# Patient Record
Sex: FEMALE | Race: WHITE | NOT HISPANIC OR LATINO | Employment: OTHER | URBAN - METROPOLITAN AREA
[De-identification: names, ages, dates, MRNs, and addresses within clinical notes are randomized per-mention and may not be internally consistent; named-entity substitution may affect disease eponyms.]

---

## 2023-10-11 ENCOUNTER — CLINICAL SUPPORT (OUTPATIENT)
Dept: PRIMARY CARE | Facility: CLINIC | Age: 73
End: 2023-10-11
Payer: MEDICARE

## 2023-10-11 DIAGNOSIS — Z23 ENCOUNTER FOR IMMUNIZATION: ICD-10-CM

## 2023-10-11 PROCEDURE — G0008 ADMIN INFLUENZA VIRUS VAC: HCPCS | Performed by: INTERNAL MEDICINE

## 2023-10-11 PROCEDURE — 90662 IIV NO PRSV INCREASED AG IM: CPT | Performed by: INTERNAL MEDICINE

## 2023-10-11 NOTE — PROGRESS NOTES
Patient presents in the office for Flu vaccine.     This drug was administered by Colette Metz MA at 9:42 AM per physician order.    Patient tolerated well.

## 2023-11-10 PROBLEM — N81.4 UTERINE PROLAPSE: Status: ACTIVE | Noted: 2023-11-10

## 2023-11-10 PROBLEM — R91.8 LUNG NODULES: Status: ACTIVE | Noted: 2022-12-03

## 2023-11-10 PROBLEM — N18.31 STAGE 3A CHRONIC KIDNEY DISEASE (MULTI): Status: ACTIVE | Noted: 2022-12-14

## 2023-11-10 PROBLEM — N95.2 ATROPHY OF VAGINA: Status: ACTIVE | Noted: 2023-11-10

## 2023-11-10 PROBLEM — E83.52 HYPERCALCEMIA: Status: ACTIVE | Noted: 2022-12-04

## 2023-11-10 PROBLEM — R00.2 PALPITATIONS: Status: ACTIVE | Noted: 2023-11-10

## 2023-11-10 PROBLEM — J21.9 BRONCHIOLITIS: Status: ACTIVE | Noted: 2022-12-03

## 2023-11-10 PROBLEM — E03.9 HYPOTHYROIDISM: Status: ACTIVE | Noted: 2023-11-10

## 2023-11-10 PROBLEM — R35.0 URINARY FREQUENCY: Status: ACTIVE | Noted: 2023-11-10

## 2023-11-10 PROBLEM — B37.31 VAGINAL CANDIDIASIS: Status: ACTIVE | Noted: 2023-11-10

## 2023-11-10 PROBLEM — E83.42 HYPOMAGNESEMIA: Status: ACTIVE | Noted: 2022-12-04

## 2023-11-10 PROBLEM — J30.9 ALLERGIC RHINITIS: Status: ACTIVE | Noted: 2023-11-10

## 2023-11-10 PROBLEM — E55.9 VITAMIN D DEFICIENCY: Status: ACTIVE | Noted: 2023-11-10

## 2023-11-10 PROBLEM — R92.8 ABNORMAL MAMMOGRAM: Status: ACTIVE | Noted: 2023-11-10

## 2023-11-10 PROBLEM — D53.9 MACROCYTIC ANEMIA: Status: ACTIVE | Noted: 2022-12-04

## 2023-11-10 PROBLEM — E28.39 HYPOESTROGENISM: Status: ACTIVE | Noted: 2023-11-10

## 2023-11-10 PROBLEM — M81.0 OSTEOPOROSIS: Status: ACTIVE | Noted: 2023-11-10

## 2023-11-10 PROBLEM — I10 BENIGN ESSENTIAL HYPERTENSION: Status: ACTIVE | Noted: 2023-11-10

## 2023-11-10 PROBLEM — I10 HYPERTENSION: Status: ACTIVE | Noted: 2023-11-10

## 2023-11-10 PROBLEM — N81.10 FEMALE BLADDER PROLAPSE: Status: ACTIVE | Noted: 2023-11-10

## 2023-11-10 PROBLEM — M35.00 SJOGREN'S SYNDROME (MULTI): Status: ACTIVE | Noted: 2022-12-14

## 2023-11-10 PROBLEM — R06.02 SHORTNESS OF BREATH: Status: ACTIVE | Noted: 2022-12-03

## 2023-11-10 PROBLEM — F41.9 ANXIETY: Status: ACTIVE | Noted: 2023-11-10

## 2023-11-10 PROBLEM — N39.0 RECURRENT URINARY TRACT INFECTION: Status: ACTIVE | Noted: 2023-11-10

## 2023-11-10 PROBLEM — N28.9 RENAL INSUFFICIENCY: Status: ACTIVE | Noted: 2023-11-10

## 2023-11-10 RX ORDER — NITROFURANTOIN (MACROCRYSTALS) 100 MG/1
1 CAPSULE ORAL EVERY 12 HOURS
COMMUNITY
Start: 2022-03-08 | End: 2023-11-13 | Stop reason: ALTCHOICE

## 2023-11-10 RX ORDER — ALBUTEROL SULFATE 90 UG/1
2 AEROSOL, METERED RESPIRATORY (INHALATION) EVERY 4 HOURS PRN
COMMUNITY
Start: 2022-12-08 | End: 2023-11-13 | Stop reason: ALTCHOICE

## 2023-11-10 RX ORDER — LEVOTHYROXINE SODIUM 50 UG/1
1 TABLET ORAL DAILY
COMMUNITY
Start: 2015-04-10 | End: 2023-11-13 | Stop reason: ALTCHOICE

## 2023-11-10 RX ORDER — IRON PS COMPLEX/B12/FOLIC ACID 150-25-1
1 CAPSULE ORAL DAILY
COMMUNITY
Start: 2022-12-14 | End: 2023-11-13 | Stop reason: ALTCHOICE

## 2023-11-10 RX ORDER — FLUTICASONE PROPIONATE 50 MCG
2 SPRAY, SUSPENSION (ML) NASAL DAILY
COMMUNITY
Start: 2020-01-13 | End: 2023-11-13 | Stop reason: ALTCHOICE

## 2023-11-10 RX ORDER — ALBUTEROL SULFATE 90 UG/1
1-2 AEROSOL, METERED RESPIRATORY (INHALATION)
COMMUNITY
Start: 2022-12-02 | End: 2023-11-13 | Stop reason: ALTCHOICE

## 2023-11-13 ENCOUNTER — OFFICE VISIT (OUTPATIENT)
Dept: OBSTETRICS AND GYNECOLOGY | Facility: CLINIC | Age: 73
End: 2023-11-13
Payer: MEDICARE

## 2023-11-13 DIAGNOSIS — Z46.89 PESSARY MAINTENANCE: ICD-10-CM

## 2023-11-13 DIAGNOSIS — N95.2 VAGINAL ATROPHY: ICD-10-CM

## 2023-11-13 DIAGNOSIS — N81.4 UTERINE PROLAPSE: Primary | ICD-10-CM

## 2023-11-13 PROCEDURE — 1126F AMNT PAIN NOTED NONE PRSNT: CPT | Performed by: NURSE PRACTITIONER

## 2023-11-13 PROCEDURE — 99213 OFFICE O/P EST LOW 20 MIN: CPT | Performed by: NURSE PRACTITIONER

## 2023-11-13 PROCEDURE — 1159F MED LIST DOCD IN RCRD: CPT | Performed by: NURSE PRACTITIONER

## 2023-11-13 PROCEDURE — 1160F RVW MEDS BY RX/DR IN RCRD: CPT | Performed by: NURSE PRACTITIONER

## 2023-11-13 RX ORDER — BUTALB/ACETAMINOPHEN/CAFFEINE 50-325-40
1 TABLET ORAL DAILY
COMMUNITY

## 2023-11-13 RX ORDER — BISMUTH SUBSALICYLATE 262 MG
1 TABLET,CHEWABLE ORAL DAILY
COMMUNITY

## 2023-11-13 NOTE — PROGRESS NOTES
Pessary Check  This is a 73 y.o. with a #2 ring with support pessary in place to manage uterine prolapse here for a routine pessary check.    Date of last check: 8/21/2023 - Dr. Michelle Cooper performed routine pessary check with normal speculum exam findings. Patient declined restarting E2 due to the Estradiol being cost-prohibitive.     Today she reports:  Pessary comfortable: Yes  Vaginal bleeding: No  Abnormal vaginal discharge: No  Using vaginal estrogen: No  Her pessary is working well to manage her POP sx and is comfortable. She is not interested in learning how to do at home pessary care at this time as she is afraid of hurting herself from having difficulty with removing/replacing the pessary back. She will reconsider learning at home pessary maintenance in the future.   The patient is not currently sexually active.     Exam:  Physical Exam  Constitutional:       Appearance: Normal appearance.   HENT:      Head: Normocephalic and atraumatic.   Pulmonary:      Effort: Pulmonary effort is normal.   Psychiatric:         Mood and Affect: Mood normal.         Behavior: Behavior normal.         Thought Content: Thought content normal.         Judgment: Judgment normal.          Procedure:   Pessary position on presentation: Normal and located high above the pubic bone  Pessary removed and cleaned without difficulty  Speculum exam: Vaginal mucosa was examined for the presence of irritation, trauma and/or bleeding. No areas of active bleeding. Scant amount of old blood noted in the vaginal vault.    Erosions: No   Vaginal atrophy: Yes   Silver nitrate applied: No  Pessary replaced without difficulty.    Assessment/Plan:  73 year old female with vaginal atrophy and uterine prolapse presenting for pessary maintenance. Comorbidities include: HTN, hypothyroidism, stage 3a CKD, renal insufficiency, macrocytic anemia, and Sjogren's syndrome.     Diagnoses:   #1 Uterine prolapse  #2 Vaginal atrophy    1. Uterine prolapse,  pessary maintenance  - #2 ring with support pessary was removed. Speculum exam did not reveal any areas of active bleeding, erosions, or granulation tissue; some old blood noted in the vault. Pessary was cleaned and replaced back.   - Her current pessary is managing her POP sx well. The patient is satisfied with the pessary and plans to continue use.   - Risks, alternative and routine maintenance reviewed with patient.  - She is not interested in learning how to do at home pessary care at this time as she is afraid of hurting herself from having difficulty with removing/replacing the pessary back. However she will reconsider learning at home pessary maintenance in the future.   - Will offer her at home pessary maintenance education at next visit.   - Reassured her that she could attempt sexual activity with the pessary in place as the pessary is located high above her pubic bone and should not interfere with intercourse or cause any discomfort.     2. Vaginal atrophy  - Patient is not using E2 due to the Estradiol being cost-prohibitive.     She will return to the office in 3 months for a pessary recheck or sooner should she have any problems.    All questions and concerns were answered and addressed.    Scribe Attestation  By signing my name below, Jared HUDSON Scribe, attest that this documentation has been prepared under the direction and in the presence of JANUSZ Brenner.    IChing, personally performed the services described in the documentation as scribed in my presence and confirm it is both complete and accurate.

## 2024-01-26 ENCOUNTER — TELEPHONE (OUTPATIENT)
Dept: PRIMARY CARE | Facility: CLINIC | Age: 74
End: 2024-01-26
Payer: MEDICARE

## 2024-02-26 ENCOUNTER — OFFICE VISIT (OUTPATIENT)
Dept: OBSTETRICS AND GYNECOLOGY | Facility: CLINIC | Age: 74
End: 2024-02-26
Payer: MEDICARE

## 2024-02-26 VITALS
WEIGHT: 102 LBS | BODY MASS INDEX: 20.03 KG/M2 | HEIGHT: 60 IN | SYSTOLIC BLOOD PRESSURE: 126 MMHG | DIASTOLIC BLOOD PRESSURE: 64 MMHG

## 2024-02-26 DIAGNOSIS — Z46.89 PESSARY MAINTENANCE: ICD-10-CM

## 2024-02-26 DIAGNOSIS — N81.4 UTERINE PROLAPSE: Primary | ICD-10-CM

## 2024-02-26 DIAGNOSIS — N95.2 VAGINAL ATROPHY: ICD-10-CM

## 2024-02-26 PROCEDURE — 3074F SYST BP LT 130 MM HG: CPT | Performed by: NURSE PRACTITIONER

## 2024-02-26 PROCEDURE — 99213 OFFICE O/P EST LOW 20 MIN: CPT | Performed by: NURSE PRACTITIONER

## 2024-02-26 PROCEDURE — 1159F MED LIST DOCD IN RCRD: CPT | Performed by: NURSE PRACTITIONER

## 2024-02-26 PROCEDURE — 3078F DIAST BP <80 MM HG: CPT | Performed by: NURSE PRACTITIONER

## 2024-02-26 PROCEDURE — 1126F AMNT PAIN NOTED NONE PRSNT: CPT | Performed by: NURSE PRACTITIONER

## 2024-02-26 PROCEDURE — 1036F TOBACCO NON-USER: CPT | Performed by: NURSE PRACTITIONER

## 2024-02-26 ASSESSMENT — PAIN SCALES - GENERAL: PAINLEVEL: 0-NO PAIN

## 2024-02-27 NOTE — PROGRESS NOTES
Pessary Check  This is a 73 y.o. with a #2 ring with support pessary in place to manage uterine prolapse here for a routine pessary check.     Date of last check: 11/13/2023 - PAOLA Brenner-CNP performed routine pessary check with normal speculum exam findings. She is not currently using E2 due to Estradiol being cost-prohibitive.     Today she reports:  Pessary comfortable: Yes  Vaginal bleeding: No  Abnormal vaginal discharge: Yes - occasional yellow discharge and changes her underwear when this occurs. This is intermittent in timing and she is unsure if she is leaking urine or if the yellow noted discharge is vaginal in origin. She thinks this is discharge from the pessary.   Using vaginal estrogen: No    Urinary Symptoms:  - She has been paying attention to her first urge to urinate and her urgency severity has improved with this.   - Denies feeling symptomatic of a UTI today; no dysuria, hesitancy, or urinary frequency.     Exam:  Physical Exam  Constitutional:       Appearance: Normal appearance.   HENT:      Head: Normocephalic and atraumatic.   Pulmonary:      Effort: Pulmonary effort is normal.   Psychiatric:         Mood and Affect: Mood normal.         Behavior: Behavior normal.         Thought Content: Thought content normal.         Judgment: Judgment normal.          Procedure:   Pessary position on presentation: Abnormal  Pessary removed and cleaned without difficulty  Speculum exam: Vaginal mucosa was examined for the presence of irritation, trauma and/or bleeding. No skin breakdown appreciated.    Erosions: No   Vaginal atrophy: Yes   Silver nitrate applied: No  Pessary replaced without difficulty.    Assessment/Plan:  73 year old female with vaginal atrophy and uterine prolapse presenting for pessary maintenance. Comorbidities include: HTN, hypothyroidism, stage 3a CKD, renal insufficiency, macrocytic anemia, and Sjogren's syndrome.      Diagnoses:   #1 Uterine prolapse  #2 Vaginal  atrophy     1. Uterine prolapse, pessary maintenance  - #2 ring with support pessary was removed. Speculum exam did not reveal any areas of active bleeding, erosions, or granulation tissue. Pessary was cleaned and replaced back.  - Her current pessary is managing her POP sx well. The patient is satisfied with the pessary and plans to continue use.   - Risks, alternative and routine maintenance reviewed with patient.  - She is not interested in learning how to do at home pessary care at this time as she is afraid of hurting herself from having difficulty with removing/replacing the pessary back. However she will reconsider learning at home pessary maintenance in the future.   - Will possibly offer her at home pessary maintenance education at next visit.     2. Vaginal atrophy  - Patient is not currently using E2 for prevention of erosions from the pessary due to the Estradiol being cost-prohibitive.     3. Urinary incontinence vs. yellow vaginal discharge from the pessary/atrophy   - We discussed that she can consider using OTC Azo for a Pyridium pad test to determine if she is leaking urine on her pad vs. having discharge associated with the pessary. If the pad is orange, she is leaking urine and if the pad remains white it is likely vaginal discharge from the pessary she is endorsing.     She will return to the office in 3 months for a pessary recheck or sooner should she have any problems.    All questions and concerns were answered and addressed.    Scribe Attestation  By signing my name below, Jared HUDSON Scribe, attest that this documentation has been prepared under the direction and in the presence of JANUSZ Brenner on 02/26/2024 at 9:50 PM.     IChing, personally performed the services described in the documentation as scribed in my presence and confirm it is both complete and accurate.

## 2024-05-13 ENCOUNTER — APPOINTMENT (OUTPATIENT)
Dept: OBSTETRICS AND GYNECOLOGY | Facility: CLINIC | Age: 74
End: 2024-05-13
Payer: MEDICARE

## 2024-06-03 ENCOUNTER — TELEPHONE (OUTPATIENT)
Dept: OBSTETRICS AND GYNECOLOGY | Facility: CLINIC | Age: 74
End: 2024-06-03

## 2024-06-03 ENCOUNTER — OFFICE VISIT (OUTPATIENT)
Dept: OBSTETRICS AND GYNECOLOGY | Facility: CLINIC | Age: 74
End: 2024-06-03
Payer: MEDICARE

## 2024-06-03 VITALS
DIASTOLIC BLOOD PRESSURE: 70 MMHG | HEIGHT: 60 IN | SYSTOLIC BLOOD PRESSURE: 136 MMHG | WEIGHT: 102 LBS | BODY MASS INDEX: 20.03 KG/M2

## 2024-06-03 DIAGNOSIS — N81.4 UTERINE PROLAPSE: Primary | ICD-10-CM

## 2024-06-03 DIAGNOSIS — Z46.89 PESSARY MAINTENANCE: ICD-10-CM

## 2024-06-03 PROCEDURE — 1036F TOBACCO NON-USER: CPT | Performed by: NURSE PRACTITIONER

## 2024-06-03 PROCEDURE — 1160F RVW MEDS BY RX/DR IN RCRD: CPT | Performed by: NURSE PRACTITIONER

## 2024-06-03 PROCEDURE — 1126F AMNT PAIN NOTED NONE PRSNT: CPT | Performed by: NURSE PRACTITIONER

## 2024-06-03 PROCEDURE — 99213 OFFICE O/P EST LOW 20 MIN: CPT | Performed by: NURSE PRACTITIONER

## 2024-06-03 PROCEDURE — 1159F MED LIST DOCD IN RCRD: CPT | Performed by: NURSE PRACTITIONER

## 2024-06-03 PROCEDURE — 3078F DIAST BP <80 MM HG: CPT | Performed by: NURSE PRACTITIONER

## 2024-06-03 PROCEDURE — 3075F SYST BP GE 130 - 139MM HG: CPT | Performed by: NURSE PRACTITIONER

## 2024-06-03 ASSESSMENT — PAIN SCALES - GENERAL: PAINLEVEL: 0-NO PAIN

## 2024-06-04 NOTE — PROGRESS NOTES
Pessary Check  This is a 73 y.o. with a #2 ring with support pessary in place to manage uterine prolapse here for a routine pessary check.      Date of last check: 2/26/2024    Today she reports:  Pessary comfortable: Yes - her pessary is managing her POP symptoms well without POP bulging around the pessary  Vaginal bleeding: No  Abnormal vaginal discharge: Yes - occasional green vaginal discharge that is not bothersome and is scant in volume.   Using vaginal estrogen: No    Urinary Symptoms:  - She has been paying attention to her first urge to urinate and her urgency severity has improved with this.     Exam:  Physical Exam  Constitutional:       Appearance: Normal appearance.   HENT:      Head: Normocephalic and atraumatic.   Pulmonary:      Effort: Pulmonary effort is normal.   Psychiatric:         Mood and Affect: Mood normal.         Behavior: Behavior normal.         Thought Content: Thought content normal.         Judgment: Judgment normal.      Procedure:   Pessary position on presentation: Normal  Pessary removed and cleaned without difficulty  Speculum exam: Vaginal mucosa was examined for the presence of irritation, trauma and/or bleeding   Erosions: No   Vaginal atrophy: Yes   Silver nitrate applied: No  Pessary replaced without difficulty.      Assessment/Plan:  73 year old female with vaginal atrophy and uterine prolapse presenting for pessary maintenance. Comorbidities include: HTN, hypothyroidism, stage 3a CKD, renal insufficiency, macrocytic anemia, and Sjogren's syndrome.      Diagnoses:   #1 Uterine prolapse  #2 Vaginal atrophy     1. Uterine prolapse, pessary maintenance  - #2 ring with support pessary was removed. Speculum exam did not reveal any areas of active bleeding, erosions, or granulation tissue. There is some minor skin breakdown near the vaginal introitus but this is nontender and is not actively bleeding. Pessary was cleaned and replaced back.  - Her current pessary is managing her  POP sx well. The patient is satisfied with the pessary and plans to continue use.   - Risks, alternative and routine maintenance reviewed with patient.  - She is not interested in learning how to do at home pessary care at this time as she is afraid of hurting herself from having difficulty with removing/replacing the pessary back. However, she will reconsider learning at home pessary maintenance in the future.      2. Vaginal atrophy  - Patient is not currently using E2 for prevention of erosions from the pessary due to the Estradiol being cost-prohibitive.   - I do not believe that we need to restart vaginal estrogen at this time    She will return to the office in 3 months for a pessary recheck or sooner should she have any problems.    All questions and concerns were answered and addressed.    Scribe Attestation  By signing my name below, I, Beau Werner, attest that this documentation has been prepared under the direction and in the presence of JANUSZ Brenner on 06/03/2024 at 10:50 PM.

## 2024-09-09 ENCOUNTER — APPOINTMENT (OUTPATIENT)
Dept: OBSTETRICS AND GYNECOLOGY | Facility: CLINIC | Age: 74
End: 2024-09-09
Payer: MEDICARE

## 2024-09-09 VITALS
HEIGHT: 60 IN | WEIGHT: 105 LBS | DIASTOLIC BLOOD PRESSURE: 76 MMHG | SYSTOLIC BLOOD PRESSURE: 120 MMHG | BODY MASS INDEX: 20.62 KG/M2

## 2024-09-09 DIAGNOSIS — Z46.89 PESSARY MAINTENANCE: ICD-10-CM

## 2024-09-09 DIAGNOSIS — N81.4 UTERINE PROLAPSE: Primary | ICD-10-CM

## 2024-09-09 PROCEDURE — 3008F BODY MASS INDEX DOCD: CPT | Performed by: NURSE PRACTITIONER

## 2024-09-09 PROCEDURE — 1126F AMNT PAIN NOTED NONE PRSNT: CPT | Performed by: NURSE PRACTITIONER

## 2024-09-09 PROCEDURE — 1159F MED LIST DOCD IN RCRD: CPT | Performed by: NURSE PRACTITIONER

## 2024-09-09 PROCEDURE — 99213 OFFICE O/P EST LOW 20 MIN: CPT | Performed by: NURSE PRACTITIONER

## 2024-09-09 PROCEDURE — 1160F RVW MEDS BY RX/DR IN RCRD: CPT | Performed by: NURSE PRACTITIONER

## 2024-09-09 PROCEDURE — 3074F SYST BP LT 130 MM HG: CPT | Performed by: NURSE PRACTITIONER

## 2024-09-09 PROCEDURE — 3078F DIAST BP <80 MM HG: CPT | Performed by: NURSE PRACTITIONER

## 2024-09-09 PROCEDURE — 1036F TOBACCO NON-USER: CPT | Performed by: NURSE PRACTITIONER

## 2024-09-09 ASSESSMENT — PAIN SCALES - GENERAL: PAINLEVEL: 0-NO PAIN

## 2024-09-09 NOTE — PROGRESS NOTES
Pessary Check    This is a 73 y.o. with a #2 ring with support pessary here for a routine pessary check.    Date of last check: 6/3/24    Today she reports:  Pessary comfortable: Yes  Vaginal bleeding:No  Abnormal vaginal discharge:No, occasionally has vaginal discharge that isn't bothersome.    Using vaginal estrogen:No    Exam:  Physical Exam  Constitutional:       Appearance: Normal appearance. She is normal weight.   Genitourinary:      Vulva normal.      No lesions in the vagina.      No vaginal erythema, ulceration or granulation tissue.   Pulmonary:      Effort: Pulmonary effort is normal.   Neurological:      General: No focal deficit present.      Mental Status: She is alert and oriented to person, place, and time.   Psychiatric:         Mood and Affect: Mood normal.         Behavior: Behavior normal.         Thought Content: Thought content normal.         Judgment: Judgment normal.   Vitals reviewed.     Procedure:   Pessary position on presentation: Normal  Pessary removed and cleaned without difficulty  Speculum exam: Vaginal mucosa was examined for the presence of irritation, trauma and/or bleeding   Erosions: No   Vaginal atrophy: Yes   Silver nitrate applied :No  Pessary replaced without difficulty.    Assessment/Plan:  Saba Mckenzie is a 73 y.o. being treated for uterine prolapse presenting for pessary maintenance. Comorbidities include: HTN, hypothyroidism, stage 3a CKD, renal insufficiency, macrocytic anemia, and Sjogren's syndrome.     1. Uterine prolapse, pessary maintenance   - The patient is satisfied with the pessary and plans to continue use.   - No need for E2 therapy at this time.   - #2 ring with support pessary was removed. Speculum exam did not reveal any areas of active bleeding, erosions, or granulation tissue. Pessary was cleaned and replaced back.      She will return to the office in 4 months for recheck or sooner should she have any problems.    All questions and concerns were  answered and addressed.    Scribe Attestation:   I, Trupti Narayan, am scribing for virtually, and in the presence of JANUSZ Brenner on 9/9/24 at 2:30 PM.     I, Ching Cortes, personally performed the services described in the documentation as scribed in my presence and confirm it is both complete and accurate.

## 2024-10-02 ENCOUNTER — TELEPHONE (OUTPATIENT)
Dept: PRIMARY CARE | Facility: CLINIC | Age: 74
End: 2024-10-02
Payer: MEDICARE

## 2024-10-03 ENCOUNTER — CLINICAL SUPPORT (OUTPATIENT)
Dept: PRIMARY CARE | Facility: CLINIC | Age: 74
End: 2024-10-03
Payer: MEDICARE

## 2024-10-03 DIAGNOSIS — Z23 NEED FOR INFLUENZA VACCINATION: ICD-10-CM

## 2024-10-03 PROCEDURE — 90662 IIV NO PRSV INCREASED AG IM: CPT | Performed by: INTERNAL MEDICINE

## 2024-10-03 PROCEDURE — G0008 ADMIN INFLUENZA VIRUS VAC: HCPCS | Performed by: INTERNAL MEDICINE

## 2024-11-22 ENCOUNTER — APPOINTMENT (OUTPATIENT)
Dept: PRIMARY CARE | Facility: CLINIC | Age: 74
End: 2024-11-22
Payer: MEDICARE

## 2024-11-22 VITALS
WEIGHT: 103.4 LBS | BODY MASS INDEX: 21.7 KG/M2 | OXYGEN SATURATION: 96 % | HEIGHT: 58 IN | SYSTOLIC BLOOD PRESSURE: 160 MMHG | TEMPERATURE: 98.2 F | DIASTOLIC BLOOD PRESSURE: 98 MMHG | HEART RATE: 114 BPM

## 2024-11-22 DIAGNOSIS — E46 PROTEIN-CALORIE MALNUTRITION, UNSPECIFIED SEVERITY (MULTI): ICD-10-CM

## 2024-11-22 DIAGNOSIS — E55.9 VITAMIN D DEFICIENCY: ICD-10-CM

## 2024-11-22 DIAGNOSIS — N18.31 STAGE 3A CHRONIC KIDNEY DISEASE (MULTI): ICD-10-CM

## 2024-11-22 DIAGNOSIS — Z00.00 MEDICARE ANNUAL WELLNESS VISIT, SUBSEQUENT: ICD-10-CM

## 2024-11-22 DIAGNOSIS — M81.0 OSTEOPOROSIS, UNSPECIFIED OSTEOPOROSIS TYPE, UNSPECIFIED PATHOLOGICAL FRACTURE PRESENCE: ICD-10-CM

## 2024-11-22 DIAGNOSIS — M35.00 SJOGREN'S SYNDROME WITHOUT EXTRAGLANDULAR INVOLVEMENT (MULTI): ICD-10-CM

## 2024-11-22 DIAGNOSIS — E28.39 HYPOESTROGENISM: ICD-10-CM

## 2024-11-22 DIAGNOSIS — E22.2 SYNDROME OF INAPPROPRIATE SECRETION OF ANTIDIURETIC HORMONE (MULTI): ICD-10-CM

## 2024-11-22 DIAGNOSIS — E03.9 ACQUIRED HYPOTHYROIDISM: ICD-10-CM

## 2024-11-22 DIAGNOSIS — I10 BENIGN ESSENTIAL HYPERTENSION: ICD-10-CM

## 2024-11-22 DIAGNOSIS — Z12.31 VISIT FOR SCREENING MAMMOGRAM: ICD-10-CM

## 2024-11-22 DIAGNOSIS — E78.5 HYPERLIPIDEMIA, UNSPECIFIED HYPERLIPIDEMIA TYPE: ICD-10-CM

## 2024-11-22 DIAGNOSIS — R00.0 TACHYCARDIA: ICD-10-CM

## 2024-11-22 DIAGNOSIS — Z13.6 SCREENING FOR HEART DISEASE: ICD-10-CM

## 2024-11-22 DIAGNOSIS — D53.9 MACROCYTIC ANEMIA: ICD-10-CM

## 2024-11-22 DIAGNOSIS — E83.42 HYPOMAGNESEMIA: ICD-10-CM

## 2024-11-22 DIAGNOSIS — Z78.0 ASYMPTOMATIC MENOPAUSE: ICD-10-CM

## 2024-11-22 DIAGNOSIS — J47.0 BRONCHIECTASIS WITH ACUTE LOWER RESPIRATORY INFECTION (MULTI): ICD-10-CM

## 2024-11-22 DIAGNOSIS — Z00.00 ROUTINE GENERAL MEDICAL EXAMINATION AT HEALTH CARE FACILITY: Primary | ICD-10-CM

## 2024-11-22 DIAGNOSIS — J96.01 ACUTE RESPIRATORY FAILURE WITH HYPOXEMIA (MULTI): ICD-10-CM

## 2024-11-22 ASSESSMENT — ACTIVITIES OF DAILY LIVING (ADL)
MANAGING_FINANCES: INDEPENDENT
GROCERY_SHOPPING: INDEPENDENT
TAKING_MEDICATION: INDEPENDENT
BATHING: INDEPENDENT
DOING_HOUSEWORK: INDEPENDENT
DRESSING: INDEPENDENT

## 2024-11-22 ASSESSMENT — ENCOUNTER SYMPTOMS
GASTROINTESTINAL NEGATIVE: 1
PSYCHIATRIC NEGATIVE: 1
EYES NEGATIVE: 1
CARDIOVASCULAR NEGATIVE: 1
HEMATOLOGIC/LYMPHATIC NEGATIVE: 1
CONSTITUTIONAL NEGATIVE: 1
NEUROLOGICAL NEGATIVE: 1
RESPIRATORY NEGATIVE: 1

## 2024-11-22 ASSESSMENT — PATIENT HEALTH QUESTIONNAIRE - PHQ9
SUM OF ALL RESPONSES TO PHQ9 QUESTIONS 1 AND 2: 0
1. LITTLE INTEREST OR PLEASURE IN DOING THINGS: NOT AT ALL
2. FEELING DOWN, DEPRESSED OR HOPELESS: NOT AT ALL

## 2024-11-22 NOTE — ASSESSMENT & PLAN NOTE
Was admitted with pneumonia at [facility] in 2022, denies any cough or shortness of breath

## 2024-11-22 NOTE — ASSESSMENT & PLAN NOTE
Patient on whether her blood pressure is normal at home, I told her to monitor twice daily and to bring her home blood pressure monitor to get validated in addition to her home BP log.

## 2024-11-22 NOTE — PROGRESS NOTES
"Subjective   Patient ID: Saba Mckenzie is a 74 y.o. female who presents for Medicare Annual Wellness Visit Subsequent (Patient is here for an annual Medicare wellness and follow up. ) and Follow-up.    This is 74-year-old patient , last seen by me around 2017,she  is here for Medicare assessment and follow-up on her general medical condition she is known to have osteoporosis, she was admitted in 2022 at [facility] for pneumonia and SIADH, GFR 53 I did review her record including her CT chest it showed 6 mm nodule , patient denies any cough at present time she was never a smoker , also CT angio at that time showed multiple compression fracture in her thoracic spine.    She denies any chest pain or shortness of breath.  She has a living will and POA ,her  is.  She has been under stress, her  was recently admitted with accelerated hypertension, her sister has Parkinson's.  She takes vitamin D supplement over-the-counter.  She see gynecologist for pessary check  She denies any complaint.  Her last Cologuard was negative  She declined colonoscopy.  Last DEXA scan 9/18/2015.  Last mammogram over 8 years ago  Last lab in 2022         Review of Systems   Constitutional: Negative.    HENT: Negative.     Eyes: Negative.    Respiratory: Negative.     Cardiovascular: Negative.    Gastrointestinal: Negative.    Genitourinary: Negative.    Neurological: Negative.    Hematological: Negative.    Psychiatric/Behavioral: Negative.         Objective   BP (!) 160/98 (BP Location: Right arm, Patient Position: Sitting)   Pulse (!) 114   Temp 36.8 °C (98.2 °F) (Temporal)   Ht 1.467 m (4' 9.75\")   Wt 46.9 kg (103 lb 6.4 oz)   SpO2 96%   BMI 21.80 kg/m²     Physical Exam  Constitutional:       General: She is not in acute distress.     Appearance: Normal appearance. She is normal weight.   HENT:      Head: Normocephalic and atraumatic.      Mouth/Throat:      Mouth: Mucous membranes are moist.      Pharynx: No " oropharyngeal exudate.   Eyes:      Extraocular Movements: Extraocular movements intact.      Pupils: Pupils are equal, round, and reactive to light.   Cardiovascular:      Rate and Rhythm: Regular rhythm. Tachycardia present.      Heart sounds: Normal heart sounds.      Comments: Heart rate 107  Pulmonary:      Effort: Pulmonary effort is normal.      Breath sounds: Normal breath sounds. No wheezing or rhonchi.   Abdominal:      General: Abdomen is flat. Bowel sounds are normal. There is no distension.      Palpations: Abdomen is soft.   Musculoskeletal:      Cervical back: Normal range of motion and neck supple.      Right lower leg: No edema.      Left lower leg: No edema.      Comments: Mild kyphosis of her thoracic spine   Skin:     General: Skin is warm.   Neurological:      General: No focal deficit present.      Mental Status: She is alert and oriented to person, place, and time.   Psychiatric:         Mood and Affect: Mood normal.         Behavior: Behavior normal.         Assessment/Plan   Problem List Items Addressed This Visit             ICD-10-CM    Benign essential hypertension I10     Patient on whether her blood pressure is normal at home, I told her to monitor twice daily and to bring her home blood pressure monitor to get validated in addition to her home BP log.         Hypoestrogenism E28.39     Will check magnesium level.         Hypomagnesemia E83.42    Relevant Orders    Magnesium    Hypothyroidism E03.9     Will check TSH         Macrocytic anemia D53.9    Osteoporosis M81.0     Will check DEXA scan, take calcium from food disorder and vitamin D, will discuss treatment after reviewing her T-score and result of her DEXA scan.         Sjogren's syndrome M35.00     Patient denies any symptoms         Stage 3a chronic kidney disease (Multi) N18.31    Relevant Orders    CBC    Comprehensive Metabolic Panel    Albumin-Creatinine Ratio, Urine Random    Vitamin D deficiency E55.9    Relevant Orders     Vitamin D 25-Hydroxy,Total (for eval of Vitamin D levels)    Medicare annual wellness visit, subsequent Z00.00    Protein-calorie malnutrition, unspecified severity (Multi) E46     Will check lab including protein         Acute respiratory failure with hypoxemia (Multi) J96.01     Was admitted with pneumonia at [facility] in 2022, denies any cough or shortness of breath         Bronchiectasis with acute lower respiratory infection (Multi) J47.0     Denies any respiratory symptoms.         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          Other Visit Diagnoses         Codes    Routine general medical examination at health care facility    -  Primary Z00.00    Relevant Orders    1 Year Follow Up In [facility] - PCP - Wellness Exam    Visit for screening mammogram     Z12.31    Relevant Orders    BI mammo bilateral screening tomosynthesis    Asymptomatic menopause     Z78.0    Relevant Orders    XR DEXA bone density    Hyperlipidemia, unspecified hyperlipidemia type     E78.5    Relevant Orders    Lipid Panel    TSH with reflex to Free T4 if abnormal    Screening for heart disease     Z13.6    Relevant Orders    CT cardiac scoring wo IV contrast

## 2024-11-22 NOTE — ASSESSMENT & PLAN NOTE
Will check DEXA scan, take calcium from food disorder and vitamin D, will discuss treatment after reviewing her T-score and result of her DEXA scan.

## 2025-01-13 ENCOUNTER — APPOINTMENT (OUTPATIENT)
Dept: OBSTETRICS AND GYNECOLOGY | Facility: CLINIC | Age: 75
End: 2025-01-13
Payer: MEDICARE

## 2025-01-13 VITALS
HEIGHT: 58 IN | SYSTOLIC BLOOD PRESSURE: 118 MMHG | BODY MASS INDEX: 21.2 KG/M2 | WEIGHT: 101 LBS | DIASTOLIC BLOOD PRESSURE: 72 MMHG

## 2025-01-13 DIAGNOSIS — N81.4 UTERINE PROLAPSE: Primary | ICD-10-CM

## 2025-01-13 DIAGNOSIS — Z46.89 PESSARY MAINTENANCE: ICD-10-CM

## 2025-01-13 PROCEDURE — 99213 OFFICE O/P EST LOW 20 MIN: CPT | Performed by: NURSE PRACTITIONER

## 2025-01-13 ASSESSMENT — PAIN SCALES - GENERAL: PAINLEVEL_OUTOF10: 0-NO PAIN

## 2025-01-13 NOTE — PROGRESS NOTES
Pessary Check    This is a 74 y.o. with a #2 ring with support pessary here for a routine pessary check.    Date of last check: 9/9/24     Today she reports:  Pessary comfortable: Yes  Vaginal bleeding:No  Abnormal vaginal discharge:No  Using vaginal estrogen:No  If she is more physically active, she can see the pessary sitting lower.     Exam:  Physical Exam  Constitutional:       General: She is not in acute distress.     Appearance: Normal appearance. She is normal weight. She is not ill-appearing.   Genitourinary:      Vulva normal.      No lesions in the vagina.      No vaginal erythema, ulceration or granulation tissue.   Pulmonary:      Effort: Pulmonary effort is normal.   Neurological:      General: No focal deficit present.      Mental Status: She is alert and oriented to person, place, and time.   Psychiatric:         Mood and Affect: Mood normal.         Behavior: Behavior normal.         Thought Content: Thought content normal.         Judgment: Judgment normal.   Vitals reviewed.     Procedure:   Pessary position on presentation: Normal  Pessary removed and cleaned without difficulty  Speculum exam: Vaginal mucosa was examined for the presence of irritation, trauma and/or bleeding. Some redness in the vagina. No bleeding.    Erosions: No   Vaginal atrophy: Yes   Silver nitrate applied :No  Pessary replaced without difficulty.    Assessment/Plan:  Saba Mckenzie is a 74 y.o. being treated for uterine prolapse presenting for pessary maintenance. Comorbidities include: HTN, hypothyroidism, stage 3a CKD, renal insufficiency, macrocytic anemia, and Sjogren's syndrome.     The patient is satisfied with the pessary and plans to continue use.   Risks, alternative and routine maintenance reviewed with patient.  No need for vaginal estrogen therapy at this time.  Explained to patient how to remove, clean, and replace the pessary if she ever wants to at home.     She will return to the office in 6 months for  recheck or sooner should she have any problems.    All questions and concerns were answered and addressed.    Scribe Attestation:   I, Trupti Narayan, am scribing for virtually, and in the presence of Ching Cortes, JANUSZ on 01/13/2025 at 1:54 PM.

## 2025-01-14 NOTE — PATIENT INSTRUCTIONS
"To reach the Urogynecology nurses for Dr. Cooper and Ching Cortes, call [phone]. You will then select option 2 to be connected to the your provider's office and then option 3 for \"[place] [facility] or [facility]arda\". This will connect you with Kita or Lauren Joseph.   "

## 2025-01-31 ENCOUNTER — TELEPHONE (OUTPATIENT)
Dept: PRIMARY CARE | Facility: CLINIC | Age: 75
End: 2025-01-31
Payer: MEDICARE

## 2025-07-07 ENCOUNTER — APPOINTMENT (OUTPATIENT)
Dept: OBSTETRICS AND GYNECOLOGY | Facility: CLINIC | Age: 75
End: 2025-07-07
Payer: MEDICARE

## 2025-07-07 VITALS
SYSTOLIC BLOOD PRESSURE: 118 MMHG | HEIGHT: 58 IN | WEIGHT: 100 LBS | BODY MASS INDEX: 20.99 KG/M2 | DIASTOLIC BLOOD PRESSURE: 70 MMHG

## 2025-07-07 DIAGNOSIS — Z46.89 PESSARY MAINTENANCE: ICD-10-CM

## 2025-07-07 DIAGNOSIS — N81.4 UTERINE PROLAPSE: Primary | ICD-10-CM

## 2025-07-07 PROCEDURE — 99213 OFFICE O/P EST LOW 20 MIN: CPT | Performed by: NURSE PRACTITIONER

## 2025-07-07 ASSESSMENT — PAIN SCALES - GENERAL: PAINLEVEL_OUTOF10: 0-NO PAIN

## 2025-07-07 NOTE — PATIENT INSTRUCTIONS
"To reach the Urogynecology nurses for Dr. Cooper and Ching Cortes, call [phone]. You will then select option 2 to be connected to the your provider's office and then option 3 for \"[place] [facility] or Narda\". This will connect you with Kita or Lauren Joseph.   "

## 2025-07-07 NOTE — PROGRESS NOTES
Pessary Check    This is a 74 y.o. with a #2 ring with support pessary here for a routine pessary check.    Date of last check: 1/13/25     Today she reports:  Pessary comfortable: Yes  Vaginal bleeding:No  Abnormal vaginal discharge:No  Using vaginal estrogen:No    Exam:  Physical Exam  Constitutional:       General: She is not in acute distress.     Appearance: Normal appearance. She is normal weight. She is not ill-appearing.   Genitourinary:      Vulva normal.      No lesions in the vagina.      No vaginal erythema, ulceration or granulation tissue.   Pulmonary:      Effort: Pulmonary effort is normal.   Neurological:      General: No focal deficit present.      Mental Status: She is alert and oriented to person, place, and time.   Psychiatric:         Mood and Affect: Mood normal.         Behavior: Behavior normal.         Thought Content: Thought content normal.         Judgment: Judgment normal.   Vitals reviewed.     Procedure:   Pessary position on presentation: Normal  Pessary removed and cleaned without difficulty  Speculum exam: Vaginal mucosa was examined for the presence of irritation, trauma and/or bleeding   Erosions: No   Vaginal atrophy: Yes   Silver nitrate applied :No  Pessary replaced without difficulty.    Assessment/Plan:  Saba Mckenzie is a 74 y.o. being treated for uterine prolapse presenting for pessary maintenance. Comorbidities include: HTN, hypothyroidism, stage 3a CKD, renal insufficiency, macrocytic anemia, and Sjogren's syndrome.     The patient is satisfied with the pessary and plans to continue use.   Risks, alternative and routine maintenance reviewed with patient.  No need for vaginal estrogen therapy at this time.    She will return to the office in 6 months for recheck with Dr. Cooper or sooner should she have any problems.    All questions and concerns were answered and addressed.    Scribe Attestation:   I, Trupti Narayan, am scribing for virtually, and in the presence of  Ching Cortes, PAOLA-CNP on 07/07/2025 at 2:26 PM.     I, Ching Cortes, personally performed the services described in the documentation as scribed in my presence and confirm it is both complete and accurate.

## 2025-07-14 ENCOUNTER — APPOINTMENT (OUTPATIENT)
Dept: OBSTETRICS AND GYNECOLOGY | Facility: CLINIC | Age: 75
End: 2025-07-14
Payer: MEDICARE

## 2025-07-23 ENCOUNTER — TELEPHONE (OUTPATIENT)
Dept: OBSTETRICS AND GYNECOLOGY | Facility: CLINIC | Age: 75
End: 2025-07-23
Payer: MEDICARE

## 2025-07-23 DIAGNOSIS — R39.9 UTI SYMPTOMS: ICD-10-CM

## 2025-07-23 NOTE — TELEPHONE ENCOUNTER
Pt contacted.  Pt c/o UTI symptoms, buringing during urination and low grade fever.  Pt suggested to give urine sample to lab.  Urine culture ordered.

## 2025-07-24 ENCOUNTER — TELEPHONE (OUTPATIENT)
Dept: OBSTETRICS AND GYNECOLOGY | Facility: CLINIC | Age: 75
End: 2025-07-24
Payer: MEDICARE

## 2025-07-24 NOTE — TELEPHONE ENCOUNTER
Pt contacted.   Pt informed urine culture is in process.    Explained to pt it usually takes 48hr to come back.  Office will be on look out for result tomorrow

## 2025-07-26 LAB — BACTERIA UR CULT: ABNORMAL

## 2025-07-28 ENCOUNTER — TELEPHONE (OUTPATIENT)
Dept: OBSTETRICS AND GYNECOLOGY | Facility: CLINIC | Age: 75
End: 2025-07-28
Payer: MEDICARE

## 2025-07-28 DIAGNOSIS — N39.0 ACUTE UTI: Primary | ICD-10-CM

## 2025-07-28 RX ORDER — NITROFURANTOIN 25; 75 MG/1; MG/1
100 CAPSULE ORAL 2 TIMES DAILY
Qty: 10 CAPSULE | Refills: 0 | Status: SHIPPED | OUTPATIENT
Start: 2025-07-28 | End: 2025-08-02

## 2025-07-28 NOTE — TELEPHONE ENCOUNTER
Pt contacted.   Informed of + urine culture result.  Is symptomatic.  Aware dr corbin will be sending in an antibiotic to [facility]  Macrobid bid x5d sent to provider to approve

## 2026-01-05 ENCOUNTER — APPOINTMENT (OUTPATIENT)
Dept: OBSTETRICS AND GYNECOLOGY | Facility: CLINIC | Age: 76
End: 2026-01-05
Payer: MEDICARE